# Patient Record
Sex: FEMALE | URBAN - NONMETROPOLITAN AREA
[De-identification: names, ages, dates, MRNs, and addresses within clinical notes are randomized per-mention and may not be internally consistent; named-entity substitution may affect disease eponyms.]

---

## 2022-03-09 ENCOUNTER — OFFICE VISIT (OUTPATIENT)
Dept: URBAN - NONMETROPOLITAN AREA CLINIC 1 | Facility: CLINIC | Age: 47
End: 2022-03-09
Payer: COMMERCIAL

## 2022-03-09 DIAGNOSIS — H52.4 PRESBYOPIA: Primary | ICD-10-CM

## 2022-03-09 PROCEDURE — 92310 CONTACT LENS FITTING OU: CPT | Performed by: OPTOMETRIST

## 2022-03-09 PROCEDURE — 92004 COMPRE OPH EXAM NEW PT 1/>: CPT | Performed by: OPTOMETRIST

## 2022-03-09 ASSESSMENT — KERATOMETRY
OS: 44.13
OD: 44.13

## 2022-03-09 ASSESSMENT — VISUAL ACUITY
OS: 20/20
OD: 20/20

## 2022-03-09 ASSESSMENT — INTRAOCULAR PRESSURE
OD: 20
OS: 19

## 2022-03-09 NOTE — IMPRESSION/PLAN
Impression: Presbyopia: H52.4. Plan: Educated pt on exam findings. Updated and finalized SRx. Educated pt on 1-2 week adaptation period with updated SRx. Educated on bifocals vs. progressive lenses. Pt expressed understanding. RTC 1 year for CE, or PRN. Educated pt on proper CL hygiene, replacement of lenses, avoiding sleeping and swimming in lenses. Trials dispensed today, RTC 1 week for CL check.